# Patient Record
Sex: MALE | NOT HISPANIC OR LATINO | Employment: STUDENT | URBAN - METROPOLITAN AREA
[De-identification: names, ages, dates, MRNs, and addresses within clinical notes are randomized per-mention and may not be internally consistent; named-entity substitution may affect disease eponyms.]

---
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
## 2024-09-30 ENCOUNTER — HOSPITAL ENCOUNTER (OUTPATIENT)
Dept: RADIOLOGY | Facility: EXTERNAL LOCATION | Age: 19
Discharge: HOME | End: 2024-09-30

## 2024-10-18 ENCOUNTER — OFFICE VISIT (OUTPATIENT)
Dept: ORTHOPEDIC SURGERY | Facility: CLINIC | Age: 19
End: 2024-10-18
Payer: COMMERCIAL

## 2024-10-18 DIAGNOSIS — S62.154A: ICD-10-CM

## 2024-10-18 DIAGNOSIS — S62.155A: Primary | ICD-10-CM

## 2024-10-18 PROCEDURE — 99215 OFFICE O/P EST HI 40 MIN: CPT | Performed by: STUDENT IN AN ORGANIZED HEALTH CARE EDUCATION/TRAINING PROGRAM

## 2024-10-18 ASSESSMENT — PAIN - FUNCTIONAL ASSESSMENT: PAIN_FUNCTIONAL_ASSESSMENT: NO/DENIES PAIN

## 2024-10-18 NOTE — PROGRESS NOTES
CHIEF COMPLAINT: Left hand pain  DOI: No discrete injury however pain since summer 2024  DOS:none      HISTORY OF PRESENT ILLNESS    This is a very pleasant 19-year-old male, right-hand-dominant, collegiate , he throws right-handed, patch right-handed, he plays catcher.  He is accompanied by his father for today's visit.  He denies active tobacco use denies known diagnosis diabetes denies anticoagulation, no significant past medical or surgical history.    He is presenting as a new patient evaluation for left hand pain.  He explains that he had no discrete injury or trauma however when he showed up for school in August he noticed some volar ulnar palm pain, worse with direct pressure especially with weightbearing such as push-ups.  He has persistent focal pain which was unrelenting and eventually obtained a local evaluation consisting of an imaging workup of an x-ray CT and MRI.  He was diagnosed with a hook of hamate fracture.  He was placed in a formal cast for approximately 1-1/2 weeks.  He has also been using removable wrist brace.  Unfortunately has had no resolution of his symptoms.  Pain is focally just is tender as it was before immobilization.  He denies any associated numbness tingling or paresthesias.  They are nearing the end of his fall ball season.      PHYSICAL EXAM    Extremities / Musculoskeletal:    Left hand:  Thick callus overlying the hook hamate otherwise no gross deformity active skin lesions or open wounds.  No erythema edema or ecchymoses.  Focally significantly tender palpation over the hook of hamate.  Pull test was performed and no pain was elicited with resisted flexion of the small and ring fingers.  FDS and FDP to all digits 5 out of 5.  5 out of 5 first dorsal interosseous.  Full active wrist range of motion in flexion extension, full active forearm rotation and pronosupination.  Motor intact radial ulnar median AIN PIN.  Sensation tact light touch radial and median.   2+ radial warm well-perfused         IMAGING / LABS / EMGs:    Imported radiographs performed at an outside hospital in the left hand ray dependently reviewed which are negative for any obvious fracture dislocation.    An MRI study of his left hand performed in outside hospital was independently reviewed demonstrating diffuse increased signal seen on the T2 images throughout the body of his hamate.  There is a complete fracture line at the base of the hook hamate identified on the sagittal views.      Past Medical History:   Diagnosis Date    Personal history of (healed) traumatic fracture     History of fracture of clavicle    Personal history of other benign neoplasm     History of other benign neoplasm    Unspecified injury of unspecified wrist, hand and finger(s), initial encounter     Injury of little finger       Medication Documentation Review Audit       Reviewed by CECILIA Garces on 10/18/24 at 1109      Medication Order Taking? Sig Documenting Provider Last Dose Status   albuterol 90 mcg/actuation inhaler [id number]  Inhale 2 puffs every 6 hours if needed for wheezing. Sandro Dugan, DO  Active                    No Known Allergies    Past Surgical History:   Procedure Laterality Date    OTHER SURGICAL HISTORY  02/12/2020    Finger fracture repair         ASSESSMENT:   Left hook of hamate fracture with delayed healing versus nonunion    We had a long discussion regarding my assessment.  This patient is a very typical presentation for acute hamate fracture and is very high risk being high-level .  We discussed for acute nondisplaced hook hamate fractures or stress fractures nonoperative treatment in the form of strict immobilization is an option.  Unfortunately he is already trialed immobilization over the course of multiple months without any significant benefit.  Furthermore imaging is concerning for a nonunion.  We discussed that a treatment option consists of a hook of hamate  excision.  The risks of this procedure include infection, bleeding, damage surrounding structures namely the deep motor branch of the ulnar nerve and the flexor tendons to the small and ring fingers.  With this procedure he would be immobilized postoperatively in a volar resting splint for 2 weeks then have his sutures removed.  He would likely benefit from occupational therapy for range of motion.  We discussed that on average return to competitive play is approximately 5 weeks following this treatment.    The patient and his father clearly understood the clinical scenario.  All questions were answered.  The patient elected to proceed with recommended surgical treatment.      PLAN:   We will plan for operative treatment in the form of a hook of hamate excision.      SURGICAL INDICATION    I reviewed the options for further management of this condition and the likely success rates of each.  The patient feels that they have maximized the benefits of conservative care, and they do want to go on to surgery.    I reviewed the major risks of surgery including infection; scarring; damage to nerves, tendons, or vessels; stiffness; and wound healing problems, as well as anesthesia risks.  I answered their questions to their satisfaction.  They were given my contact information and will contact the office when they are ready to schedule an exact surgical date.  Surgery will be posted as follows :    Dx :          Left Hook of hamate fracture  ICD-10 :      S62.154  Procedure :     Excision of left hook of hamate  CPT :        96995  Anesth :    Regional block + MACvsLMA  Location :   [facility] 10/21/24  Duration :    1.5hrs  Specials :    lead hand, mini C-arm  PAT :       No  Post-Op Visit :    10-15 days    Follow-up in: 2wks  XR at next visit: no      Nirmal Yao M.D.    Department of Orthopaedics  Hand/Upper Extremity  Phone: [phone]  Appt. Phone: [phone]

## 2024-10-18 NOTE — H&P (VIEW-ONLY)
CHIEF COMPLAINT: Left hand pain  DOI: No discrete injury however pain since summer 2024  DOS:none      HISTORY OF PRESENT ILLNESS    This is a very pleasant 19-year-old male, right-hand-dominant, collegiate , he throws right-handed, patch right-handed, he plays catcher.  He is accompanied by his father for today's visit.  He denies active tobacco use denies known diagnosis diabetes denies anticoagulation, no significant past medical or surgical history.    He is presenting as a new patient evaluation for left hand pain.  He explains that he had no discrete injury or trauma however when he showed up for school in August he noticed some volar ulnar palm pain, worse with direct pressure especially with weightbearing such as push-ups.  He has persistent focal pain which was unrelenting and eventually obtained a local evaluation consisting of an imaging workup of an x-ray CT and MRI.  He was diagnosed with a hook of hamate fracture.  He was placed in a formal cast for approximately 1-1/2 weeks.  He has also been using removable wrist brace.  Unfortunately has had no resolution of his symptoms.  Pain is focally just is tender as it was before immobilization.  He denies any associated numbness tingling or paresthesias.  They are nearing the end of his fall ball season.      PHYSICAL EXAM    Extremities / Musculoskeletal:    Left hand:  Thick callus overlying the hook hamate otherwise no gross deformity active skin lesions or open wounds.  No erythema edema or ecchymoses.  Focally significantly tender palpation over the hook of hamate.  Pull test was performed and no pain was elicited with resisted flexion of the small and ring fingers.  FDS and FDP to all digits 5 out of 5.  5 out of 5 first dorsal interosseous.  Full active wrist range of motion in flexion extension, full active forearm rotation and pronosupination.  Motor intact radial ulnar median AIN PIN.  Sensation tact light touch radial and median.   2+ radial warm well-perfused         IMAGING / LABS / EMGs:    Imported radiographs performed at an outside hospital in the left hand ray dependently reviewed which are negative for any obvious fracture dislocation.    An MRI study of his left hand performed in outside hospital was independently reviewed demonstrating diffuse increased signal seen on the T2 images throughout the body of his hamate.  There is a complete fracture line at the base of the hook hamate identified on the sagittal views.      Past Medical History:   Diagnosis Date    Personal history of (healed) traumatic fracture     History of fracture of clavicle    Personal history of other benign neoplasm     History of other benign neoplasm    Unspecified injury of unspecified wrist, hand and finger(s), initial encounter     Injury of little finger       Medication Documentation Review Audit       Reviewed by CECILIA Garces on 10/18/24 at 1109      Medication Order Taking? Sig Documenting Provider Last Dose Status   albuterol 90 mcg/actuation inhaler [id number]  Inhale 2 puffs every 6 hours if needed for wheezing. Sandro Dugan, DO  Active                    No Known Allergies    Past Surgical History:   Procedure Laterality Date    OTHER SURGICAL HISTORY  02/12/2020    Finger fracture repair         ASSESSMENT:   Left hook of hamate fracture with delayed healing versus nonunion    We had a long discussion regarding my assessment.  This patient is a very typical presentation for acute hamate fracture and is very high risk being high-level .  We discussed for acute nondisplaced hook hamate fractures or stress fractures nonoperative treatment in the form of strict immobilization is an option.  Unfortunately he is already trialed immobilization over the course of multiple months without any significant benefit.  Furthermore imaging is concerning for a nonunion.  We discussed that a treatment option consists of a hook of hamate  excision.  The risks of this procedure include infection, bleeding, damage surrounding structures namely the deep motor branch of the ulnar nerve and the flexor tendons to the small and ring fingers.  With this procedure he would be immobilized postoperatively in a volar resting splint for 2 weeks then have his sutures removed.  He would likely benefit from occupational therapy for range of motion.  We discussed that on average return to competitive play is approximately 5 weeks following this treatment.    The patient and his father clearly understood the clinical scenario.  All questions were answered.  The patient elected to proceed with recommended surgical treatment.      PLAN:   We will plan for operative treatment in the form of a hook of hamate excision.      SURGICAL INDICATION    I reviewed the options for further management of this condition and the likely success rates of each.  The patient feels that they have maximized the benefits of conservative care, and they do want to go on to surgery.    I reviewed the major risks of surgery including infection; scarring; damage to nerves, tendons, or vessels; stiffness; and wound healing problems, as well as anesthesia risks.  I answered their questions to their satisfaction.  They were given my contact information and will contact the office when they are ready to schedule an exact surgical date.  Surgery will be posted as follows :    Dx :          Left Hook of hamate fracture  ICD-10 :      S62.154  Procedure :     Excision of left hook of hamate  CPT :        14246  Anesth :    Regional block + MACvsLMA  Location :   [facility] 10/21/24  Duration :    1.5hrs  Specials :    lead hand, mini C-arm  PAT :       No  Post-Op Visit :    10-15 days    Follow-up in: 2wks  XR at next visit: no      Nirmal Yao M.D.    Department of Orthopaedics  Hand/Upper Extremity  Phone: [phone]  Appt. Phone: [phone]

## 2024-10-21 ENCOUNTER — ANESTHESIA (OUTPATIENT)
Dept: OPERATING ROOM | Facility: CLINIC | Age: 19
End: 2024-10-21
Payer: COMMERCIAL

## 2024-10-21 ENCOUNTER — ANESTHESIA EVENT (OUTPATIENT)
Dept: OPERATING ROOM | Facility: CLINIC | Age: 19
End: 2024-10-21
Payer: COMMERCIAL

## 2024-10-21 ENCOUNTER — HOSPITAL ENCOUNTER (OUTPATIENT)
Facility: CLINIC | Age: 19
Setting detail: OUTPATIENT SURGERY
Discharge: HOME | End: 2024-10-21
Attending: STUDENT IN AN ORGANIZED HEALTH CARE EDUCATION/TRAINING PROGRAM | Admitting: STUDENT IN AN ORGANIZED HEALTH CARE EDUCATION/TRAINING PROGRAM
Payer: COMMERCIAL

## 2024-10-21 VITALS
OXYGEN SATURATION: 95 % | SYSTOLIC BLOOD PRESSURE: 140 MMHG | TEMPERATURE: 97.3 F | BODY MASS INDEX: 29.14 KG/M2 | DIASTOLIC BLOOD PRESSURE: 75 MMHG | RESPIRATION RATE: 16 BRPM | WEIGHT: 208.11 LBS | HEART RATE: 78 BPM | HEIGHT: 71 IN

## 2024-10-21 DIAGNOSIS — G89.18 ACUTE POST-OPERATIVE PAIN: Primary | ICD-10-CM

## 2024-10-21 DIAGNOSIS — S62.154A: ICD-10-CM

## 2024-10-21 PROBLEM — J45.909 ASTHMA: Status: ACTIVE | Noted: 2024-10-21

## 2024-10-21 PROCEDURE — 64415 NJX AA&/STRD BRCH PLXS IMG: CPT | Performed by: ANESTHESIOLOGY

## 2024-10-21 PROCEDURE — 3700000001 HC GENERAL ANESTHESIA TIME - INITIAL BASE CHARGE: Performed by: STUDENT IN AN ORGANIZED HEALTH CARE EDUCATION/TRAINING PROGRAM

## 2024-10-21 PROCEDURE — A25210 PR REMOVAL OF CARPAL BONE: Performed by: ANESTHESIOLOGY

## 2024-10-21 PROCEDURE — 3600000004 HC OR TIME - INITIAL BASE CHARGE - PROCEDURE LEVEL FOUR: Performed by: STUDENT IN AN ORGANIZED HEALTH CARE EDUCATION/TRAINING PROGRAM

## 2024-10-21 PROCEDURE — 7100000002 HC RECOVERY ROOM TIME - EACH INCREMENTAL 1 MINUTE: Performed by: STUDENT IN AN ORGANIZED HEALTH CARE EDUCATION/TRAINING PROGRAM

## 2024-10-21 PROCEDURE — 3600000009 HC OR TIME - EACH INCREMENTAL 1 MINUTE - PROCEDURE LEVEL FOUR: Performed by: STUDENT IN AN ORGANIZED HEALTH CARE EDUCATION/TRAINING PROGRAM

## 2024-10-21 PROCEDURE — 2500000005 HC RX 250 GENERAL PHARMACY W/O HCPCS: Performed by: STUDENT IN AN ORGANIZED HEALTH CARE EDUCATION/TRAINING PROGRAM

## 2024-10-21 PROCEDURE — 7100000010 HC PHASE TWO TIME - EACH INCREMENTAL 1 MINUTE: Performed by: STUDENT IN AN ORGANIZED HEALTH CARE EDUCATION/TRAINING PROGRAM

## 2024-10-21 PROCEDURE — 7100000001 HC RECOVERY ROOM TIME - INITIAL BASE CHARGE: Performed by: STUDENT IN AN ORGANIZED HEALTH CARE EDUCATION/TRAINING PROGRAM

## 2024-10-21 PROCEDURE — 3700000002 HC GENERAL ANESTHESIA TIME - EACH INCREMENTAL 1 MINUTE: Performed by: STUDENT IN AN ORGANIZED HEALTH CARE EDUCATION/TRAINING PROGRAM

## 2024-10-21 PROCEDURE — 25210 REMOVAL OF WRIST BONE: CPT | Performed by: STUDENT IN AN ORGANIZED HEALTH CARE EDUCATION/TRAINING PROGRAM

## 2024-10-21 PROCEDURE — A25210 PR REMOVAL OF CARPAL BONE: Performed by: NURSE ANESTHETIST, CERTIFIED REGISTERED

## 2024-10-21 PROCEDURE — 2500000004 HC RX 250 GENERAL PHARMACY W/ HCPCS (ALT 636 FOR OP/ED)

## 2024-10-21 PROCEDURE — 7100000009 HC PHASE TWO TIME - INITIAL BASE CHARGE: Performed by: STUDENT IN AN ORGANIZED HEALTH CARE EDUCATION/TRAINING PROGRAM

## 2024-10-21 RX ORDER — FENTANYL CITRATE 50 UG/ML
INJECTION, SOLUTION INTRAMUSCULAR; INTRAVENOUS AS NEEDED
Status: DISCONTINUED | OUTPATIENT
Start: 2024-10-21 | End: 2024-10-21

## 2024-10-21 RX ORDER — SODIUM CHLORIDE 0.9 G/100ML
IRRIGANT IRRIGATION AS NEEDED
Status: DISCONTINUED | OUTPATIENT
Start: 2024-10-21 | End: 2024-10-21 | Stop reason: HOSPADM

## 2024-10-21 RX ORDER — OXYCODONE HYDROCHLORIDE 5 MG/1
5 TABLET ORAL EVERY 6 HOURS PRN
Qty: 12 TABLET | Refills: 0 | Status: SHIPPED | OUTPATIENT
Start: 2024-10-21 | End: 2024-10-24

## 2024-10-21 RX ORDER — ONDANSETRON HYDROCHLORIDE 2 MG/ML
INJECTION, SOLUTION INTRAVENOUS AS NEEDED
Status: DISCONTINUED | OUTPATIENT
Start: 2024-10-21 | End: 2024-10-21

## 2024-10-21 RX ORDER — LIDOCAINE HYDROCHLORIDE 20 MG/ML
INJECTION, SOLUTION INFILTRATION; PERINEURAL AS NEEDED
Status: DISCONTINUED | OUTPATIENT
Start: 2024-10-21 | End: 2024-10-21

## 2024-10-21 RX ORDER — ACETAMINOPHEN 500 MG
1000 TABLET ORAL EVERY 6 HOURS
Qty: 112 TABLET | Refills: 0 | Status: SHIPPED | OUTPATIENT
Start: 2024-10-21 | End: 2024-11-04

## 2024-10-21 RX ORDER — MIDAZOLAM HYDROCHLORIDE 1 MG/ML
INJECTION, SOLUTION INTRAMUSCULAR; INTRAVENOUS AS NEEDED
Status: DISCONTINUED | OUTPATIENT
Start: 2024-10-21 | End: 2024-10-21

## 2024-10-21 RX ORDER — CEFAZOLIN 1 G/1
INJECTION, POWDER, FOR SOLUTION INTRAVENOUS AS NEEDED
Status: DISCONTINUED | OUTPATIENT
Start: 2024-10-21 | End: 2024-10-21

## 2024-10-21 RX ORDER — PROPOFOL 10 MG/ML
INJECTION, EMULSION INTRAVENOUS AS NEEDED
Status: DISCONTINUED | OUTPATIENT
Start: 2024-10-21 | End: 2024-10-21

## 2024-10-21 ASSESSMENT — PAIN - FUNCTIONAL ASSESSMENT
PAIN_FUNCTIONAL_ASSESSMENT: 0-10

## 2024-10-21 ASSESSMENT — COLUMBIA-SUICIDE SEVERITY RATING SCALE - C-SSRS
1. IN THE PAST MONTH, HAVE YOU WISHED YOU WERE DEAD OR WISHED YOU COULD GO TO SLEEP AND NOT WAKE UP?: NO
2. HAVE YOU ACTUALLY HAD ANY THOUGHTS OF KILLING YOURSELF?: NO
6. HAVE YOU EVER DONE ANYTHING, STARTED TO DO ANYTHING, OR PREPARED TO DO ANYTHING TO END YOUR LIFE?: NO

## 2024-10-21 ASSESSMENT — PAIN SCALES - GENERAL
PAINLEVEL_OUTOF10: 0 - NO PAIN

## 2024-10-21 NOTE — ANESTHESIA PROCEDURE NOTES
Peripheral Block    Patient location during procedure: pre-op  Start time: 10/21/2024 7:21 AM  End time: 10/21/2024 7:32 AM  Reason for block: at surgeon's request and post-op pain management  Staffing  Performed: attending   Authorized by: Audra Granda DO    Performed by: Audra Granda DO  Preanesthetic Checklist  Completed: patient identified, IV checked, site marked, risks and benefits discussed, surgical consent, monitors and equipment checked, pre-op evaluation and timeout performed   Timeout performed at: 10/21/2024 7:19 AM  Peripheral Block  Patient position: sitting  Prep: ChloraPrep  Patient monitoring: heart rate and continuous pulse ox  Block type: supraclavicular  Laterality: left  Injection technique: single-shot  Local infiltration: ropivacaine  Infiltration strength: 0.5 %  Dose: 25 mL  Needle  Needle type: pencil-tip   Needle gauge: 21 G  Needle length: 5 cm  Assessment  Injection assessment: negative aspiration for heme, no paresthesia on injection, incremental injection, local visualized surrounding nerve on ultrasound and transient paresthesias  Paresthesia pain: immediately resolved  Heart rate change: no  Slow fractionated injection: yes  Additional Notes  Single shot left sided supraclavicular nerve block performed under direct ultrasound guidance.  Site cleaned with chloraprep x 2.  Procedure done under strict sterile technique.  Skin localized with 1% Lidocaine.  Appropriate structures identified with ultrasound imaging.   21G Pecan needle inserted under US visualization.  0.5% Ropivacaine wth epi injected under direct ultrasound guidance.  25ml of 0.5% Ropivacaine injected under direct ultrasound guidance.  Serial aspirations every 5ml.  Aspirations negative for heme.  Negative paresthesias.  Patient tolerated procedure well without immediate complications.

## 2024-10-21 NOTE — DISCHARGE INSTRUCTIONS
Post-Operative Instructions  Dr. Nirmal Yao [phone]   [phone]    Dressing:  You have a bandage or splint covering your operative site.    Do not remove the dressing until your next scheduled appointment with your surgeon or therapist. Keep your dressing clean and dry. The dressing will be removed at that appointment.     Post Anesthesia Instructions:  If you received general anesthesia or IV sedation for your procedure for the next 24 hours: No driving, no drinking alcohol, no sleeping aids, no important decision making, and have an adult with you for 24 hours.    Showering:  You may shower following surgery but please adhere to above instructions regarding the dressing. If showering with bandage/splint in place please ensure that it is kept dry and covered while bathing. There are commercially available cast bags that can be used to protect your dressing while showering. If using garbage bags please make sure that there are no holes in the bag and that the bag has been sealed above the dressing. If the bandage gets wet you must contact your surgeon's office to make arrangements to be seen to have the bandage changed.     Ice/Elevation:  The application of ice to your surgical site after surgery will help with pain control and swelling. This can be very effective for 48-72 hours after surgery. Please be careful to avoid getting bandage wet from a leaky ice bag. Please be advised that the ice may need to be applied for longer periods of time for the cooling effect to penetrate the post-operative dressing.     Elevation of the operative site above the level of the heart as much as possible for the first 48-72 hours after surgery. Use your sling if you have been provided one while standing or walking. If your fingers are not included in the dressing you are encouraged to attempt finger range of motion as this will help with your hand swelling and ultimate recovery.    Pain  Medication:  If you received a regional anesthetic on the day of your surgery your arm and hand may be numb for up to 24 hours after surgery. It is important to wear your sling while the block is still effective in order to protect your arm. It is advisable to take pain medications prior to going to sleep in case the regional anesthesia medication wears off while you are sleeping.    Take your pain medications as directed. Narcotic pain medications can cause lethargy, nausea and constipation. Please contact your surgeon's office and discontinue the medication if these symptoms become problematic. Eating a regular diet, drinking fluids and walking can help with constipation from these medications. Avoid alcohol consumption and driving while taking narcotic pain medications.     Additional pain control options:     You are encouraged to take over the counter medications like Advil / Motrin / Ibuprofen / Aleve in addition to your prescribed pain medications after surgery.    Smoking/Tobacco:  Tobacco use is known to interfere with wound and fracture healing and increase post-operative pain. It can also increase your risk of poor outcomes following surgery. Please do not use tobacco or nicotine products following your surgery. This includes smokeless tobacco, or nicotine replacement products (patches, gum, etc.).    Driving:  It is not advisable to operate a vehicle while using narcotic pain medications. Additionally, please be advised that you are likely to have challenges operating a car or motorcycle while you are still in your postoperative dressing and this could increase your risk of being involved in an accident and being cited for driving while physically impaired.     Warning Signs:  Observe your arm/hand and incision site (if visible) for increased redness, inflammation, drainage, odor or pain that is unrelieved by rest, elevation or medication. Please contact your surgeon's office immediately if you develop  any of these issues or if you develop a fever greater than 101°.    Follow Up Appointments:  You do not yet have a post-operative appointment scheduled. Please contact Dr. Yao's office at [phone] to schedule this appointment.

## 2024-10-21 NOTE — ANESTHESIA PREPROCEDURE EVALUATION
Patient: Juanjo Almanzar    Procedure Information       Date/Time: 10/21/24 0730    Procedure: Excision of left hook of hamate / 1.5hrs (Left: Wrist) - Regional block + MACvsLMA    Location: [facility] OR 03 / Virtual [facility]    Surgeons: Nirmal Yao MD            Relevant Problems   Anesthesia (within normal limits)      Cardiac (within normal limits)      Pulmonary   (+) Asthma (Well controlled, allergy induced exercise cold induced)      Neuro (within normal limits)      GI (within normal limits)      /Renal (within normal limits)      Liver (within normal limits)      Endocrine (within normal limits)      Hematology (within normal limits)      Musculoskeletal (within normal limits)      HEENT (within normal limits)      ID (within normal limits)      Skin (within normal limits)      GYN (within normal limits)       Clinical information reviewed:    Allergies    Med Hx  Surg Hx   Fam Hx          NPO Detail:  NPO/Void Status  Date of Last Liquid: 10/20/24  Time of Last Liquid: 2350  Date of Last Solid: 10/20/24  Time of Last Solid: 2200         Physical Exam    Airway  Mallampati: I  TM distance: >3 FB  Neck ROM: full     Cardiovascular    Dental - normal exam     Pulmonary    Abdominal        Anesthesia Plan    History of general anesthesia?: yes  History of complications of general anesthesia?: no    ASA 1     general     intravenous induction   Anesthetic plan and risks discussed with patient.    Plan discussed with CRNA.

## 2024-10-21 NOTE — H&P
[facility] Department of Orthopaedic Surgery   Surgical History & Physical >30 Days    Reason for Surgery: left hook of hamate fracture/nonunion  Planned Procedure: left hook of hamate excision    History & Physical Reviewed:  Juanjo Almanzar is a 19 y.o. male presenting with the above symptoms. This patient was evaluated as an outpatient, and a plan was made for operative management. Risks and benefits were discussed, and the patient and/or caregivers elected to proceed. The patient presents for the above listed procedure today.     Past Medical History:   Diagnosis Date    Asthma     Personal history of (healed) traumatic fracture     History of fracture of clavicle    Personal history of other benign neoplasm     History of other benign neoplasm    Unspecified injury of unspecified wrist, hand and finger(s), initial encounter     Injury of little finger     Past Surgical History:   Procedure Laterality Date    OTHER SURGICAL HISTORY  02/12/2020    Finger fracture repair     Social History     Tobacco Use    Smoking status: Not on file    Smokeless tobacco: Not on file   Substance Use Topics    Alcohol use: Not on file     Prior to Admission medications    Medication Sig Start Date End Date Taking? Authorizing Provider   albuterol 90 mcg/actuation inhaler Inhale 2 puffs every 6 hours if needed for wheezing. 11/22/23  Yes Sandro Dugan, DO     No Known Allergies    Review of Systems:   Gen: Denies recent weight loss  Neuro: Denies recent confusion  Ophtho: Denies changes in vision  ENT: Denies changes in hearing  Endo: Denies weight loss/weight gain  CV: Denies chest pain  Resp: Denies shortness of breath  GI: Denies melena/hematochezia  : Denies painful urination  MSK: Per above HPI  Heme: No abnormal bleeding  Psych: Denies hallucinations    Physical Exam:  - Constitutional: No acute distress, cooperative  - Eyes: EOM grossly intact  - Head/Neck: Trachea midline  - Respiratory/Thorax: Normal work of  breathing  - Cardiovascular: RRR on peripheral palpation  - Gastrointestinal: Nondistended  - Psychological: Appropriate mood/behavior  - Skin: Warm and dry. Additional findings in musculoskeletal evaluation  - Musculoskeletal: Moves all extremities     ERAS patient?: No    COVID-19 Risk Consent:   Surgeon has reviewed the key risks related to maile COVID-19 and subsequent sequelae.       Tay Owusu MD   Orthopaedic Surgery PGY-2

## 2024-10-21 NOTE — ANESTHESIA POSTPROCEDURE EVALUATION
Patient: Juanjo Almanzar    Procedure Summary       Date: 10/21/24 Room / Location: [facility] / [facility]    Anesthesia Start: 0746 Anesthesia Stop: 0912    Procedure: Excision of left hook of hamate / 1.5hrs (Left: Wrist) Diagnosis:       Nondisplaced fracture of hook process of hamate (unciform) bone, right wrist, initial encounter for closed fracture      (Nondisplaced fracture of hook process of hamate (unciform) bone, right wrist, initial encounter for closed fracture [S62.154A])    Surgeons: Nirmal Yao MD Responsible Provider: Audra Granda DO    Anesthesia Type: general ASA Status: 1            Anesthesia Type: general    Vitals Value Taken Time   /59 10/21/24 0924   Temp 36.2 °C (97.2 °F) 10/21/24 0909   Pulse 81 10/21/24 0924   Resp 16 10/21/24 0924   SpO2 96 % 10/21/24 0924       Anesthesia Post Evaluation    Patient location during evaluation: PACU  Patient participation: complete - patient participated  Level of consciousness: awake  Pain management: satisfactory to patient  Multimodal analgesia pain management approach  Airway patency: patent  Cardiovascular status: acceptable  Respiratory status: acceptable  Hydration status: acceptable  Postoperative Nausea and Vomiting: none    There were no known notable events for this encounter.

## 2024-10-21 NOTE — ANESTHESIA PROCEDURE NOTES
Airway  Date/Time: 10/21/2024 7:50 AM  Urgency: elective    Airway not difficult    Staffing  Performed: DEREK   Authorized by: Audra Granda DO    Performed by: Cira Edwards  Patient location during procedure: OR    Indications and Patient Condition  Indications for airway management: anesthesia and airway protection  Spontaneous Ventilation: absent  Sedation level: deep  Preoxygenated: yes  Patient position: sniffing  Mask difficulty assessment: 1 - vent by mask  Planned trial extubation    Final Airway Details  Final airway type: supraglottic airway      Successful airway: Supraglottic airway: igel.  Size 4     Number of attempts at approach: 1

## 2024-10-21 NOTE — BRIEF OP NOTE
Date: 10/21/2024  [place] [facility]    Name: Juanjo Almanzar, : 2005, Age: 19 y.o., MRN: [id number], Sex: male    Diagnosis  Pre-op Diagnosis      * Nondisplaced fracture of hook process of hamate (unciform) bone, right wrist, initial encounter for closed fracture [S62.154A] Post-op Diagnosis     * Nondisplaced fracture of hook process of hamate (unciform) bone, right wrist, initial encounter for closed fracture [S62.154A]     Procedures  Excision of left hook of hamate / 1.5hrs  84370 - NE CARPECTOMY 1 BONE      Surgeons      * Nirmal Yao - Primary    Resident/Fellow/Other Assistant:  Surgeons and Role:  * No surgeons found with a matching role *    Procedure Summary  Anesthesia: General  ASA: I  Anesthesia Staff: Anesthesiologist: Audra Granda DO  CRNA: MACIEJ Denny-CRNA  SRNA: Cira Edwards  Estimated Blood Loss: 5mL  Intra-op Medications:   Administrations occurring from 0730 to 0915 on 10/21/24:   Medication Name Total Dose   sodium chloride 0.9 % irrigation solution 250 mL   ceFAZolin (Ancef) vial 1 g 2 g   dexAMETHasone (Decadron) 4 mg/mL 4 mg   fentaNYL PF 0.05 mg/mL 50 mcg   LR bolus Cannot be calculated   lidocaine (Xylocaine) injection 2 % 40 mg   ondansetron 2 mg/mL 4 mg   propofol (Diprivan) infusion 10 mg/mL 348.68 mg              Anesthesia Record               Intraprocedure I/O Totals          Intake    LR bolus 400.00 mL    Propofol Drip 0.00 mL    The total shown is the total volume documented since Anesthesia Start was filed.    Total Intake 400 mL          Specimen: No specimens collected     Staff:   Circulator: Kristi  Scrub Person: Daniel Hansenub Person: Manjula          Findings: Hook of hamate nonunion    Complications:  None; patient tolerated the procedure well.     Disposition: PACU - hemodynamically stable.  Condition: stable  Specimens Collected: No specimens collected  Attending Attestation:     Nirmal Yao  Phone Number: [phone]

## 2024-10-22 NOTE — OP NOTE
ORTHOPEDIC OPERATIVE NOTE    Name:     Juanjo Almanzar  :     2005  Facility:    [facility]  Date of Surgery:   10/21/2024     PREOP DX:     Left hook of hamate symptomatic non-union    POSTOP DX:   Left hook of hamate symptomatic non-union    PROCEDURE:  Left hook of hamate excision (CPT 62268)    IMPLANTS:   None    SURGEON: Nirmal Yao M.D.    RESIDENT/FELLOW/ASSISTANT:  NINO Silver M.D.    ANESTHESIA:    Regional Block + LMA    ESTIMATED BLOOD LOSS :   2 ml    TOTAL FLUIDS:     per anesthesia report     SPECIMEN:   None    TOURNIQUET TIME:  <60 Minutes at 250 mmHg    COMPLICATIONS:  None    PATIENT RETURNED TO/CONDITION:  PACU in Good    INDICATIONS:      Juanjo Almanzar is a 19 y.o. male right-hand-dominant, collegiate , who presented to clinic with months of focal volar ulnar wrist pain.  Workup revealed asymptomatic nonunion of the hook of the hamate which she failed conservative treatment in the form of immobilization.  I recommended an excision of the hook of the hamate in order to treat his pain and promote optimal function.  We discussed the risks of this procedure include infection bleeding damage surrounding structures, need for subsequent surgery, chronic pain, chronic stiffness, wound issues.  Patient clearly understand the clinical scenario.  All questions were answered.  Patient like to proceed with surgical treatment.    DESCRIPTION OF PROCEDURE:  The anesthesia team administered a regional block in preoperative holding area to the operative extremity without complication.    A safety huddle was performed with all members of the nursing anesthesia and operative team.  The patient was correctly identified with multiple unique identifiers, the correct laterality, surgical site, and listed procedure on the consent was confirmed.    The anesthesia team administered cefazolin for surgical antibiotic prophylaxis.  A very well-padded  brachial pneumatic tourniquet was placed.  The anesthesia team administered LMA general anesthesia without complication.  The patient remained supine on the stretcher the operative extremity out on the hand table.  All bony prominences were well-padded.  Bilateral SCDs were applied. the operative extremity was prepped and draped in usual sterile fashion with ChloraPrep solution.    A safety preincision pause was performed with all members of the nursing anesthesia and operative team. The patient was correctly identified with multiple unique identifiers, the correct laterality, surgical site, and listed procedure on the consent was confirmed.    Left hook of hamate excision (CPT 62908)  The tourniquet was inflated to 250 mmHg  A 4 cm curvilinear incision was made on the volar ulnar aspect of the palm just parallel and ulnar to the hypothenar crease.  A Yumiko style oblique limb was made across the wrist flexion crease and this incision was extended and across the volar aspect of the distal forearm by approximately 1 cm.    The skin incision was made with a 15 blade.  Deeper dissection was carried out with dissecting scissors.  Meticulous hemostasis was obtained.   The antebrachial fascia was sharply incised.  The ulnar neurovascular bundle at the distal aspect of the forearm was identified.  This was then followed into Guyon's canal.  The palmar ligament and palmaris brevis were sharply incised taking great care to protect the neurovasculature deep to it.  The ulnar artery had 2 small transverse branches which were cauterized with Bovie electrocautery.  We then mobilized the ulnar artery ulnarly.  The superficial branch of the ulnar nerve was identified and safely protected throughout the procedure.  The deep branch of the ulnar nerve was identified behind deep to the fascia of the hypothenar musculature.  The deep motor branch was safely protected with a Plummer and the piece of hamate and fascia of the hypothenar  muscles were sharply incised decompressing the deep motor branch.  The hook of hamate was palpated deep in the wound.  A subperiosteal dissection was performed around the hook of the hamate down to the base of the hamate body.  The nonunion site was clearly mobile with no significant union.  This was rongeured away while safely protecting the deep motor branch and the flexor tendons.  There was still slight prominence after the hook hamate was excised at the fracture site so this was further rongeured down to have a flush smooth surface of the body of the hamate.  This fresh surface of the base of the hook of the hamate was then gently rasped to smooth without of any sharp bony aspects.  Bone wax was applied.  The digital flexors were then again visualized and confirmed to be uninjured.  The deep motor branch was examined and confirmed to be uninjured and in continuity.    The wound was copiously irrigated.  The skin was closed using #3-0 nylon suture in an interrupted horizontal fashion.  The wound was dressed with Xeroform sterile 4 x 4's and a very well-padded volar resting slab splint was placed.    The tourniquet was released with a total time of less than 60 minutes.  All surgical counts were correct at the conclusion of the case.  The patient woke from general anesthesia transferred recovery without complication.    POST-OPERATIVE PLAN:  He will be nonweightbearing the left upper he will be nonweightbearing the left upper extremity in a volar resting plaster splint.  He will need a sling while the blocks and affect.  I encourage maximal elevation and finger range of motion.  I will see him back in roughly 2 weeks for a wound check and suture removal.  Anticipate advancing to light activities at that point and expected to be clearing him for activities as tolerated at the 4-week postop jorge luis          Electronically signed  Nirmal Yao MD  [phone]

## 2024-11-08 ENCOUNTER — APPOINTMENT (OUTPATIENT)
Dept: ORTHOPEDIC SURGERY | Facility: CLINIC | Age: 19
End: 2024-11-08
Payer: COMMERCIAL

## 2024-11-08 ENCOUNTER — OFFICE VISIT (OUTPATIENT)
Dept: ORTHOPEDIC SURGERY | Facility: CLINIC | Age: 19
End: 2024-11-08
Payer: COMMERCIAL

## 2024-11-08 VITALS — WEIGHT: 208 LBS | HEIGHT: 71 IN | BODY MASS INDEX: 29.12 KG/M2

## 2024-11-08 DIAGNOSIS — S62.155A: ICD-10-CM

## 2024-11-08 PROCEDURE — 3008F BODY MASS INDEX DOCD: CPT | Performed by: STUDENT IN AN ORGANIZED HEALTH CARE EDUCATION/TRAINING PROGRAM

## 2024-11-08 PROCEDURE — 99211 OFF/OP EST MAY X REQ PHY/QHP: CPT | Performed by: STUDENT IN AN ORGANIZED HEALTH CARE EDUCATION/TRAINING PROGRAM

## 2024-11-08 PROCEDURE — L3908 WHO COCK-UP NONMOLDE PRE OTS: HCPCS | Performed by: STUDENT IN AN ORGANIZED HEALTH CARE EDUCATION/TRAINING PROGRAM

## 2024-11-10 NOTE — PROGRESS NOTES
CHIEF COMPLAINT: 2wk POV  DOI:no discrete injury   DOS:10/21/24  Excision of L hook of hamate    HISTORY OF PRESENT ILLNESS    This is a very pleasant 19yM returning for scheduled 2wk POV. He reports to be doing well. Compliant with splinting and NWB status. No pain. Denies N/T/P    PHYSICAL EXAM    Extremities / Musculoskeletal:                  L Hand:  Well healed surgical wound. Nylon sutures removed today. No sign of infection. NTTP throughout palm. Full composite flexion, no pain.   5/5 FDP to SF, 5/5 first HIMANSHU  Motor intact to R/U/M/AIN/PIN  SILT R/U/M  2+ radial WWP      IMAGING / LABS / EMGs:    No interval imaging obtained for todays visit     ASSESSMENT:   S/p L hook of hamate excision on 10/21/24 by Dr. Yao     Doing very well. Fit for cock up wrist brace. Give the wound one more week to heal with steri-strips, then wean out over the following week    PLAN:   Cock up wrist brace today  To be on at all times except showering and gentle ROM  After 1wk, begin to wean out  If painless after 2wks, okay to return to activity as desired     Follow-up in: d/t pt being away at college, he will follow-up only if he has any issues or concerns   XR at next visit: none      Nirmla Yao M.D.    Department of Orthopaedics  Hand/Upper Extremity  Phone: [phone]  Appt. Phone: [phone]

## 2025-05-09 DIAGNOSIS — J45.909 ASTHMA, UNSPECIFIED ASTHMA SEVERITY, UNSPECIFIED WHETHER COMPLICATED, UNSPECIFIED WHETHER PERSISTENT (HHS-HCC): ICD-10-CM

## 2025-05-09 RX ORDER — ALBUTEROL SULFATE 90 UG/1
2 INHALANT RESPIRATORY (INHALATION) EVERY 6 HOURS PRN
Qty: 18 G | Refills: 2 | Status: SHIPPED | OUTPATIENT
Start: 2025-05-09

## 2025-05-09 NOTE — TELEPHONE ENCOUNTER
Patient needs refill on  Albuterol 90mcg inhaler 2 puffs every 6 hours for wheezing  Ph[facility]acy [facility]

## 2025-07-29 ENCOUNTER — TELEPHONE (OUTPATIENT)
Facility: CLINIC | Age: 20
End: 2025-07-29
Payer: COMMERCIAL

## 2025-07-29 DIAGNOSIS — J45.909 ASTHMA, UNSPECIFIED ASTHMA SEVERITY, UNSPECIFIED WHETHER COMPLICATED, UNSPECIFIED WHETHER PERSISTENT (HHS-HCC): ICD-10-CM

## 2025-07-29 RX ORDER — ALBUTEROL SULFATE 90 UG/1
2 INHALANT RESPIRATORY (INHALATION) EVERY 6 HOURS PRN
Qty: 18 G | Refills: 2 | Status: SHIPPED | OUTPATIENT
Start: 2025-07-29

## 2025-07-29 NOTE — TELEPHONE ENCOUNTER
Patient needs refill on  Albuterol 90mcg inhale 2 puffs every 6 hours as needed.  Pharmacy [facility]

## 2025-09-02 ENCOUNTER — TELEPHONE (OUTPATIENT)
Facility: CLINIC | Age: 20
End: 2025-09-02
Payer: COMMERCIAL

## 2025-09-02 DIAGNOSIS — J45.909 ASTHMA, UNSPECIFIED ASTHMA SEVERITY, UNSPECIFIED WHETHER COMPLICATED, UNSPECIFIED WHETHER PERSISTENT (HHS-HCC): Primary | ICD-10-CM

## 2025-09-02 RX ORDER — FLUTICASONE PROPIONATE 110 UG/1
2 AEROSOL, METERED RESPIRATORY (INHALATION)
Qty: 12 G | Refills: 5 | Status: SHIPPED | OUTPATIENT
Start: 2025-09-02 | End: 2026-09-02

## (undated) DEVICE — GLOVE, SURGICAL, PROTEXIS PI BLUE W/NEUTHERA, 8.0, PF, LF

## (undated) DEVICE — DRESSING, GAUZE, SPONGE, 12 PLY, 4 X 4 IN, PLASTIC POUCH, STRL 10PK

## (undated) DEVICE — GLOVE, SURGICAL, PROTEXIS PI , 7.5, PF, LF

## (undated) DEVICE — SUTURE, ETHILON, 4-0, BLK, MONO, PS-2 18

## (undated) DEVICE — Device

## (undated) DEVICE — CUFF, TOURNIQUET, 18 X 4, DUAL PORT/SNGL BLADDER, DISP, LF

## (undated) DEVICE — APPLICATOR, CHLORAPREP, W/ORANGE TINT, 26ML

## (undated) DEVICE — SYRINGE, 20 CC, LUER LOCK